# Patient Record
Sex: FEMALE | Race: BLACK OR AFRICAN AMERICAN | HISPANIC OR LATINO | Employment: UNEMPLOYED | URBAN - NONMETROPOLITAN AREA
[De-identification: names, ages, dates, MRNs, and addresses within clinical notes are randomized per-mention and may not be internally consistent; named-entity substitution may affect disease eponyms.]

---

## 2017-01-04 DIAGNOSIS — S42.302D CLOSED FRACTURE OF SHAFT OF LEFT HUMERUS WITH ROUTINE HEALING, UNSPECIFIED FRACTURE MORPHOLOGY, SUBSEQUENT ENCOUNTER: Primary | ICD-10-CM

## 2017-01-05 ENCOUNTER — APPOINTMENT (OUTPATIENT)
Dept: GENERAL RADIOLOGY | Facility: HOSPITAL | Age: 82
End: 2017-01-05
Attending: ORTHOPAEDIC SURGERY

## 2017-01-16 DIAGNOSIS — S42.295D OTHER CLOSED NONDISPLACED FRACTURE OF PROXIMAL END OF LEFT HUMERUS WITH ROUTINE HEALING, SUBSEQUENT ENCOUNTER: Primary | ICD-10-CM

## 2017-01-17 ENCOUNTER — HOSPITAL ENCOUNTER (OUTPATIENT)
Dept: GENERAL RADIOLOGY | Facility: HOSPITAL | Age: 82
Discharge: HOME | End: 2017-01-17
Attending: ORTHOPAEDIC SURGERY | Admitting: ORTHOPAEDIC SURGERY

## 2017-01-17 ENCOUNTER — OFFICE VISIT (OUTPATIENT)
Dept: ORTHOPEDIC SURGERY | Facility: CLINIC | Age: 82
End: 2017-01-17

## 2017-01-17 ENCOUNTER — HOSPITAL ENCOUNTER (OUTPATIENT)
Dept: GENERAL RADIOLOGY | Facility: HOSPITAL | Age: 82
Discharge: HOME | End: 2017-01-17

## 2017-01-17 VITALS
SYSTOLIC BLOOD PRESSURE: 132 MMHG | DIASTOLIC BLOOD PRESSURE: 78 MMHG | HEIGHT: 63 IN | HEART RATE: 66 BPM | WEIGHT: 200 LBS | BODY MASS INDEX: 35.44 KG/M2

## 2017-01-17 DIAGNOSIS — S42.342D CLOSED DISPLACED SPIRAL FRACTURE OF SHAFT OF LEFT HUMERUS WITH ROUTINE HEALING, SUBSEQUENT ENCOUNTER: Primary | ICD-10-CM

## 2017-01-17 DIAGNOSIS — S42.295D OTHER CLOSED NONDISPLACED FRACTURE OF PROXIMAL END OF LEFT HUMERUS WITH ROUTINE HEALING, SUBSEQUENT ENCOUNTER: ICD-10-CM

## 2017-01-17 DIAGNOSIS — S42.342D CLOSED DISPLACED SPIRAL FRACTURE OF SHAFT OF LEFT HUMERUS WITH ROUTINE HEALING, SUBSEQUENT ENCOUNTER: ICD-10-CM

## 2017-01-17 PROCEDURE — 99024 POSTOP FOLLOW-UP VISIT: CPT | Performed by: ORTHOPAEDIC SURGERY

## 2017-01-17 PROCEDURE — 73030 X-RAY EXAM OF SHOULDER: CPT

## 2017-01-17 PROCEDURE — 73060 X-RAY EXAM OF HUMERUS: CPT | Performed by: RADIOLOGY

## 2017-01-17 PROCEDURE — 73060 X-RAY EXAM OF HUMERUS: CPT

## 2017-01-17 PROCEDURE — 73030 X-RAY EXAM OF SHOULDER: CPT | Performed by: RADIOLOGY

## 2017-01-17 NOTE — PROGRESS NOTES
Patient: Charleen Mireles  YOB: 1928  Date of Encounter: 01/17/2017        History of Present Illness:     88-year-old female seen today for follow-up secondary to a left humeral shaft fracture suffered 10/and 25/16. She is now approximately 11 weeks status post initial injury. She is a resident at nursing home stating that she has progressed out of the fracture brace and sling with complaints of mild pain and weakness of her left shoulder. She also has reports today of left hand pain and stiffness most specifically the third and fourth digits.    Physical Exam: 88 y.o. female .  General Appearance:    Well appearing, cooperative, in no acute distress.       Patient is alert and oriented x 3.          Musculoskeletal: On examination today the patient has very minimal swelling of the left upper arm. There is no obvious deformity. She has active range of motion with flexion to approximately 50° further passive range of motion. No obvious motion of the fracture site. Her left hand is grossly neurovascularly intact with mild swelling and active stiffness and pain upon attempted extension of the third and fourth digits.       Radiology:       Radiographs: AP, lateral views of the left humerus reveal no significant change in alignment or positioning the fracture with abundant callus formation. Fracture line still evident.    Assessment:    ICD-10-CM ICD-9-CM   1. Closed displaced spiral fracture of shaft of left humerus with routine healing, subsequent encounter S42.342D V54.11       Plan:      The patient will continue progressive activity out of the sling. She was also given an order for occupational therapy to begin working on gentle range of motion left shoulder as well as the left hand. She will return back in 6 weeks for repeat x-rays and further evaluation      Discussion and Summary:    Written by Car Huertas PA-C, acting as scribe for Dr. Jered SANDOVAL, Dr. Lawton personally performed the  services described in this documentation, as scribed by Car Huertas PA-C, in my presence, and is both accurate and complete.      This document was signed by Car Huertas PA-C January 17, 2017 1:36 PM

## 2017-01-17 NOTE — MR AVS SNAPSHOT
Charleen Mireles   1/17/2017 1:00 PM   Office Visit    Dept Phone:  [phone]   Encounter #:  [id number]    Provider:  Jeancarlos Lawton MD   Department:  [facility]                Your Full Care Plan              Your Updated Medication List          This list is accurate as of: 1/17/17  1:36 PM.  Always use your most recent med list.                amLODIPine 5 MG tablet   Commonly known as:  NORVASC       atorvastatin 40 MG tablet   Commonly known as:  LIPITOR       castor oil-balsam peru ointment   Apply 1 application topically 2 (Two) Times a Day. Apply to buttocks and sacral area/sweetie area       clopidogrel 75 MG tablet   Commonly known as:  PLAVIX       * dextrose 40 % gel   Commonly known as:  GLUTOSE   Take 15 g by mouth As Needed for low blood sugar.       * dextrose 40 % gel   Commonly known as:  GLUTOSE   Take 15 g by mouth As Needed for low blood sugar.       * dextrose 50 % solution   Commonly known as:  D50W   Infuse 50 mL into a venous catheter As Needed for low blood sugar.       * dextrose 50 % solution   Commonly known as:  D50W   Infuse 50 mL into a venous catheter As Needed for low blood sugar.       * glucagon 1 MG injection   Commonly known as:  GLUCAGEN   Inject 1 mg under the skin 1 (One) Time As Needed (hypoglycemia) for up to 1 dose.       * glucagon 1 MG injection   Commonly known as:  GLUCAGEN   Inject 1 mg under the skin 1 (One) Time As Needed (hypoglycemia) for up to 1 dose.       insulin aspart 100 UNIT/ML injection   Commonly known as:  novoLOG   Inject 0-7 Units under the skin 4 (Four) Times a Day Before Meals & at Bedtime.       insulin detemir 100 UNIT/ML injection   Commonly known as:  LEVEMIR   Inject 10 Units under the skin Every 12 (Twelve) Hours.       levothyroxine 50 MCG tablet   Commonly known as:  SYNTHROID, LEVOTHROID       * Notice:  This list has 6 medication(s) that are the same as other medications  prescribed for you. Read the directions carefully, and ask your doctor or other care provider to review them with you.            We Performed the Following     Ambulatory Referral to Occupational Therapy       You Were Diagnosed With        Codes Comments    Closed displaced spiral fracture of shaft of left humerus with routine healing, subsequent encounter    -  Primary ICD-10-CM: S42.342D  ICD-9-CM: V54.11       Instructions     None    Patient Instructions History      Upcoming Appointments     Visit Type Date Time Department    FOLLOW UP 2017  1:00 PM [facility] [place]    XR COR SHOULDER 2+ VW L 2017  1:15 PM [facility]    XR COR HUMERUS L 2017  2:30 PM [facility]    FOLLOW UP 2017  9:20 AM [facility] [place]      [facility] Signup     [employer] allows you to send messages to your doctor, view your test results, renew your prescriptions, schedule appointments, and more. To sign up, go to [employer] and click on the Sign Up Now link in the New User? box. Enter your [facility] Activation Code exactly as it appears below along with the last four digits of your Social Security Number and your Date of Birth () to complete the sign-up process. If you do not sign up before the expiration date, you must request a new code.    [facility] Activation Code: [email or web address]  Expires: 2017  1:36 PM    If you have questions, you can email [email or web address] or call [phone] to talk to our [facility] staff. Remember, [facility] is NOT to be used for urgent needs. For medical emergencies, dial 911.               Other Info from Your Visit           Your Appointments     2017  2:30 PM EST   XR cor humerus l with COR [facility]TH XR 1   [facility] [place] [facility] ([place])    [address] [facility] [id number] [phone]           Bring along any outside films relating to this procedure. Arrive 15 minutes before your scheduled time.     "        Feb 28, 2017  9:20 AM EST   Follow Up with Jeancarlos Lawton MD   [facility] (--)    [address]  [place] [address] [phone]           Arrive 15 minutes prior to appointment.              Allergies     Penicillins        Reason for Visit     Left Shoulder - Follow-up     Shoulder Pain           Vital Signs     Blood Pressure Pulse Height Weight Body Mass Index Smoking Status    132/78 66 63\" (160 cm) 200 lb (90.7 kg) 35.43 kg/m2 Current Every Day Smoker      Problems and Diagnoses Noted     Broken arm        "

## 2017-02-24 DIAGNOSIS — S42.342D CLOSED DISPLACED SPIRAL FRACTURE OF SHAFT OF LEFT HUMERUS WITH ROUTINE HEALING, SUBSEQUENT ENCOUNTER: Primary | ICD-10-CM

## 2017-02-28 ENCOUNTER — HOSPITAL ENCOUNTER (OUTPATIENT)
Dept: GENERAL RADIOLOGY | Facility: HOSPITAL | Age: 82
Discharge: HOME | End: 2017-02-28
Attending: ORTHOPAEDIC SURGERY